# Patient Record
(demographics unavailable — no encounter records)

---

## 2024-11-06 NOTE — PHYSICAL EXAM
[Ankle Swelling (On Exam)] : not present [Varicose Veins Of Lower Extremities] : not present [] : not present [Delayed in the Right Toes] : capillary refills normal in right toes [Delayed in the Left Toes] : capillary refills normal in the left toes [1+] : left foot dorsalis pedis 1+ [TextEntry] : The medial and lateral borders of both hallux nails are incurvated.  There is pain upon direct palpation of both toes.  No pus or discharge is noted.

## 2024-11-06 NOTE — ASSESSMENT
[FreeTextEntry1] : Assessment: Ingrown toenail, medial and lateral border, left and right hallux.  Plan: I performed straightback procedures utilizing a 12.7 cm sterile box lock, double spring fine cut back D tip fine tip stainless steel nail splitter removing the medial and lateral borders of both the left and right hallux nails as far back as tolerable and applied Amerigel wound gel with sterile compression dressings. The patient was instructed to start soaking the feet in lukewarm water and Epsom salts, 2 tablespoons per pint for 20 minutes twice per day.  The patient was advised to dry the feet with a clean towel and then apply a sterile dressing to the area for the next 5 days.  I advised the patient to notify the office right away if increased redness, swelling, pain, open wounds or discharge were observed.  PTR:  6 weeks.

## 2024-11-06 NOTE — HISTORY OF PRESENT ILLNESS
[FreeTextEntry1] : Stef Sotomayor returns stating that that he has been doing pretty well since he was here.  His ingrowns have been manageable but are starting to hurt again.

## 2024-12-16 NOTE — HISTORY OF PRESENT ILLNESS
[FreeTextEntry1] : Stef Sotomayor returns stating that he has been having a lot of pain in his great toes.  He states that the pain is to the point where when he puts his socks on.  He feels pressure and pain.  He denies any new trauma or injury.

## 2024-12-16 NOTE — PHYSICAL EXAM
[Ankle Swelling (On Exam)] : not present [Varicose Veins Of Lower Extremities] : not present [] : not present [Delayed in the Right Toes] : capillary refills normal in right toes [Delayed in the Left Toes] : capillary refills normal in the left toes [1+] : left foot dorsalis pedis 1+ [TextEntry] : The medial and lateral borders of both hallux nails are incurvated.  There is pain upon palpation of all four borders.  No discharge or malodor is noted.

## 2025-01-15 NOTE — ASSESSMENT
[FreeTextEntry1] : Assessment: Ingrown toenail, medial aspect, left and right hallux.  Plan:   I performed straightback procedures utilizing a 12.7 cm sterile box lock, double spring fine cut back D tip fine tip stainless steel nail splitter removing the medial and lateral borders of both the left and right hallux nails as far back as tolerable and applied Amerigel wound gel with sterile compression dressings. The patient was instructed to start soaking the feet in lukewarm water and Epsom salts, 2 tablespoons per pint for 20 minutes twice per day.  The patient was advised to dry the feet with a clean towel and then apply a sterile dressing to the area for the next 5 days.  I advised the patient to notify the office right away if increased redness, swelling, pain, open wounds or discharge were observed.  PTR:  6 weeks.

## 2025-01-15 NOTE — HISTORY OF PRESENT ILLNESS
[FreeTextEntry1] : Stef Sotomayor returns stating that since he has been coming more regularly, he has had less pain in his ingrown nails.  He states that he has been able to walk without pain.  He states that the nails on the outside are just starting to press and hurt him in his walking shoes, so he kept his appointment.  He denies seeing any pus or signs of infection.

## 2025-01-15 NOTE — PHYSICAL EXAM
[Ankle Swelling (On Exam)] : not present [Varicose Veins Of Lower Extremities] : not present [] : not present [Delayed in the Right Toes] : capillary refills normal in right toes [Delayed in the Left Toes] : capillary refills normal in the left toes [1+] : left foot dorsalis pedis 1+ [TextEntry] : The medial aspects of both hallux nails are incurvated.  There is swelling in the nail folds.  No pus or discharge is present.

## 2025-03-05 NOTE — PHYSICAL EXAM
[Ankle Swelling (On Exam)] : not present [Varicose Veins Of Lower Extremities] : not present [] : not present [Delayed in the Right Toes] : capillary refills normal in right toes [Delayed in the Left Toes] : capillary refills normal in the left toes [1+] : left foot dorsalis pedis 1+ [TextEntry] : The medial and lateral borders of both hallux nails are incurvated.  There is pain upon palpation of both nail plates.  No pus or bleeding is present.  Ecchymosis is absent.

## 2025-03-05 NOTE — HISTORY OF PRESENT ILLNESS
[FreeTextEntry1] : Stef Sotomayor returns stating that he is grateful for the treatment because he has been able to continue walking regularly for exercise.  He states that both great toes are feeling pretty good, but that if he misses the appointment, he knows they will hurt.  He denies seeing any discharge or bleeding.

## 2025-04-24 NOTE — PHYSICAL EXAM
[Ankle Swelling (On Exam)] : not present [Varicose Veins Of Lower Extremities] : not present [] : not present [Delayed in the Right Toes] : capillary refills normal in right toes [Delayed in the Left Toes] : capillary refills normal in the left toes [1+] : left foot dorsalis pedis 1+ [TextEntry] : The medial and lateral borders of both hallux nails are incurvated.  There is swelling and pain present in the lateral nail folds, but the overall appearance of the nails is improved as is the pain level.

## 2025-04-24 NOTE — ASSESSMENT
[FreeTextEntry1] : Assessment: Ingrown toenail, lateral aspect, left and right hallux.  Plan: I performed straightback procedures utilizing a 12.7 cm sterile box lock, double spring fine cut back D tip fine tip stainless steel nail splitter removing the medial and lateral borders of both the left and right hallux nails as far back as tolerable and applied Amerigel wound gel with sterile compression dressings. The patient was instructed to start soaking the feet in lukewarm water and Epsom salts, 2 tablespoons per pint for 20 minutes twice per day.  The patient was advised to dry the feet with a clean towel and then apply a sterile dressing to the area for the next 5 days.  I advised the patient to notify the office right away if increased redness, swelling, pain, open wounds or discharge were observed.  PTR:  6 weeks.

## 2025-04-24 NOTE — HISTORY OF PRESENT ILLNESS
[FreeTextEntry1] : He returns stating that he has been walking much better.  He is not having heel pain and his ingrown nails are starting to feel a little bit better.  He is not in throbbing pain between visits.  He states that he is glad he is coming in regularly.

## 2025-06-11 NOTE — ASSESSMENT
[FreeTextEntry1] : Assessment: Ingrown toenail, lateral aspect left and right hallux. Plantar fasciitis with heel spur syndrome bilaterally.  Plan:   I performed straightback procedures utilizing a 12.7 cm sterile box lock, double spring fine cut back D tip fine tip stainless steel nail splitter removing the medial and lateral borders of both the left and right hallux nails as far back as tolerable and applied Amerigel wound gel with sterile compression dressings. The patient was instructed to start soaking the feet in lukewarm water and Epsom salts, 2 tablespoons per pint for 20 minutes twice per day.  The patient was advised to dry the feet with a clean towel and then apply a sterile dressing to the area for the next 5 days.  I reviewed the stretching exercises in detail with Vick.  He seemed to know how to do them.  I advised him to add ice to the regular treatment to help with the inflammation and to wear his orthotics.  If all is well, he will return in six weeks.

## 2025-06-11 NOTE — PHYSICAL EXAM
[Ankle Swelling (On Exam)] : not present [Varicose Veins Of Lower Extremities] : not present [] : not present [Delayed in the Right Toes] : capillary refills normal in right toes [Delayed in the Left Toes] : capillary refills normal in the left toes [1+] : left foot dorsalis pedis 1+ [TextEntry] : The lateral borders of both hallux nails are incurvated.  Swelling is minimal.  The plantar medial heels exhibit tightness with mild increase in skin temperature.

## 2025-06-11 NOTE — HISTORY OF PRESENT ILLNESS
[FreeTextEntry1] : Stef Sotomayor returns stating that his ingrowns have been doing better.  He did want to talk about his heels today.  He stated that they were sore.  He has been stretching and that does help.

## 2025-07-25 NOTE — PHYSICAL EXAM
[Ankle Swelling (On Exam)] : not present [Varicose Veins Of Lower Extremities] : not present [] : not present [Delayed in the Right Toes] : capillary refills normal in right toes [Delayed in the Left Toes] : capillary refills normal in the left toes [1+] : left foot dorsalis pedis 1+ [TextEntry] : The medial and lateral borders of both hallux nails are incurvated.  There is some ecchymosis in the nail folds, but no bleeding or pus is present.  Pain upon palpation of both toenails is present.

## 2025-07-25 NOTE — HISTORY OF PRESENT ILLNESS
[FreeTextEntry1] : Stef Sotomayor returns stating that his ingrown nails have been doing much better since he has been coming regularly.  He denies much pain.  He states that when he was coming less frequently, he was having pain all the time.